# Patient Record
Sex: MALE | Race: ASIAN | HISPANIC OR LATINO
[De-identification: names, ages, dates, MRNs, and addresses within clinical notes are randomized per-mention and may not be internally consistent; named-entity substitution may affect disease eponyms.]

---

## 2020-10-06 ENCOUNTER — TRANSCRIPTION ENCOUNTER (OUTPATIENT)
Age: 45
End: 2020-10-06

## 2021-06-02 ENCOUNTER — TRANSCRIPTION ENCOUNTER (OUTPATIENT)
Age: 46
End: 2021-06-02

## 2023-02-08 ENCOUNTER — NON-APPOINTMENT (OUTPATIENT)
Age: 48
End: 2023-02-08

## 2023-02-14 PROBLEM — Z00.00 ENCOUNTER FOR PREVENTIVE HEALTH EXAMINATION: Status: ACTIVE | Noted: 2023-02-14

## 2023-02-16 ENCOUNTER — APPOINTMENT (OUTPATIENT)
Dept: DERMATOLOGY | Facility: CLINIC | Age: 48
End: 2023-02-16
Payer: MEDICAID

## 2023-02-16 VITALS — BODY MASS INDEX: 30.31 KG/M2 | WEIGHT: 200 LBS | HEIGHT: 68 IN

## 2023-02-16 PROCEDURE — 99204 OFFICE O/P NEW MOD 45 MIN: CPT | Mod: 25

## 2023-02-16 PROCEDURE — 17110 DESTRUCTION B9 LES UP TO 14: CPT

## 2023-02-17 NOTE — HISTORY OF PRESENT ILLNESS
[FreeTextEntry1] : np wound on lip [de-identified] : Mr. BRITT CABALLERO  is a 47 year old M here for evaluation of below\par #non healing wound on lip x 1 yr\par #crusted lesion on lip x 2 months\par #bump on body x yrs\par #psoriasis of scalp x yrs, using topicals\par \par \par \par

## 2023-02-17 NOTE — ASSESSMENT
[FreeTextEntry1] : #NUBS, lower mucosal lip; VV?\par lesion cauterized today, pt tolerated procedure well\par wound care discussed\par - fluconazole 200mg PO once to eliminate any candidal element\par \par #herpes labialis\par pt defers tx at this time\par \par #folliculitis\par deferred addressing at this time until acute lip lesion is resolved\par \par RTC 1 mo

## 2023-02-17 NOTE — PHYSICAL EXAM
[Alert] : alert [Oriented x 3] : ~L oriented x 3 [Well Nourished] : well nourished [Conjunctiva Non-injected] : conjunctiva non-injected [No Visual Lymphadenopathy] : no visual  lymphadenopathy [No Clubbing] : no clubbing [No Edema] : no edema [No Bromhidrosis] : no bromhidrosis [No Chromhidrosis] : no chromhidrosis [FreeTextEntry3] : submillimeter erosion on lower mucosal lip\par 2 hemorrhagic crusted papules on lower vermilion lip\par few scattered pustules and pink papules with collarettes of scale on trunk, extremities\par discrete psoriatic plaques in scalp\par

## 2023-03-29 ENCOUNTER — APPOINTMENT (OUTPATIENT)
Dept: DERMATOLOGY | Facility: CLINIC | Age: 48
End: 2023-03-29
Payer: MEDICAID

## 2023-03-29 PROCEDURE — 99214 OFFICE O/P EST MOD 30 MIN: CPT | Mod: 25

## 2023-03-29 PROCEDURE — 17110 DESTRUCTION B9 LES UP TO 14: CPT

## 2023-03-29 NOTE — HISTORY OF PRESENT ILLNESS
[FreeTextEntry1] :  wound on lip [de-identified] : Mr. BRITT CABALLERO  is a 47 year old M here for evaluation of below\par #non healing wound on lip x 1 yr. some response to cautery\par #crusted lesion on lip x 2 months\par #bump on body x yrs\par #psoriasis of scalp x yrs, using topicals\par \par \par \par

## 2023-03-29 NOTE — ASSESSMENT
[FreeTextEntry1] : #NUBS, lower mucosal lip; VV?\par lesion cauterized today, pt tolerated procedure well\par -treated with cautery at setting of 2.5; SED including burning, scarring, and incomplete tx. patient verbalized understanding\par wound care discussed\par \par #herpes labialis\par pt defers tx at this time\par \par #folliculitis\par deferred addressing at this time until acute lip lesion is resolved\par \par #seb derm\par mix HC and keto PRN; SED\par \par RTC 1 mo

## 2023-05-10 PROBLEM — B07.9 VERRUCA WARTS (INFECTIOUS): Status: ACTIVE | Noted: 2023-02-17

## 2023-05-10 PROBLEM — B37.0 CANDIDIASIS, MOUTH: Status: ACTIVE | Noted: 2023-02-16

## 2023-05-10 PROBLEM — D48.5 NEOPLASM OF UNCERTAIN BEHAVIOR OF SKIN: Status: ACTIVE | Noted: 2023-02-16

## 2023-05-10 PROBLEM — B00.1 HERPES LABIALIS: Status: ACTIVE | Noted: 2023-02-16

## 2023-05-11 ENCOUNTER — APPOINTMENT (OUTPATIENT)
Dept: DERMATOLOGY | Facility: CLINIC | Age: 48
End: 2023-05-11
Payer: MEDICAID

## 2023-05-11 DIAGNOSIS — D48.5 NEOPLASM OF UNCERTAIN BEHAVIOR OF SKIN: ICD-10-CM

## 2023-05-11 DIAGNOSIS — B07.9 VIRAL WART, UNSPECIFIED: ICD-10-CM

## 2023-05-11 DIAGNOSIS — B00.1 HERPESVIRAL VESICULAR DERMATITIS: ICD-10-CM

## 2023-05-11 DIAGNOSIS — B37.0 CANDIDAL STOMATITIS: ICD-10-CM

## 2023-05-11 PROCEDURE — 17110 DESTRUCTION B9 LES UP TO 14: CPT

## 2023-05-11 PROCEDURE — 99214 OFFICE O/P EST MOD 30 MIN: CPT | Mod: 25

## 2023-05-11 RX ORDER — FLUCONAZOLE 200 MG/1
200 TABLET ORAL DAILY
Qty: 7 | Refills: 2 | Status: ACTIVE | COMMUNITY
Start: 2023-02-16 | End: 1900-01-01

## 2023-05-11 NOTE — ASSESSMENT
[FreeTextEntry1] : #NUBS, lower mucosal lip; VV?\par lesion cauterized today, pt tolerated procedure well\par -treated with cautery at setting of 2.5; SED including burning, scarring, and incomplete tx. patient verbalized understanding\par wound care discussed\par \par #herpes labialis\par pt defers tx at this time\par \par #folliculitis\par deferred addressing at this time until acute lip lesion is resolved\par \par #seb derm\par mix HC and keto PRN; SED\par difuclan 200 mg PO daily x7 days; SED\par \par RTC 1 mo

## 2023-05-11 NOTE — HISTORY OF PRESENT ILLNESS
[FreeTextEntry1] :  wound on lip [de-identified] : Mr. BRITT CABALLERO  is a 47 year old M here for evaluation of below\par #non healing wound on lip x 1 yr. some response to cautery\par #crusted lesion on lip x 2 months\par #bump on body x yrs\par #psoriasis of scalp x yrs, using topicals\par \par \par \par

## 2023-06-07 ENCOUNTER — APPOINTMENT (OUTPATIENT)
Dept: DERMATOLOGY | Facility: CLINIC | Age: 48
End: 2023-06-07
Payer: MEDICAID

## 2023-07-06 ENCOUNTER — APPOINTMENT (OUTPATIENT)
Dept: DERMATOLOGY | Facility: CLINIC | Age: 48
End: 2023-07-06

## 2024-02-15 ENCOUNTER — NON-APPOINTMENT (OUTPATIENT)
Age: 49
End: 2024-02-15

## 2024-02-28 ENCOUNTER — APPOINTMENT (OUTPATIENT)
Dept: DERMATOLOGY | Facility: CLINIC | Age: 49
End: 2024-02-28
Payer: MEDICAID

## 2024-02-28 PROCEDURE — 99214 OFFICE O/P EST MOD 30 MIN: CPT

## 2024-02-28 RX ORDER — KETOCONAZOLE 20 MG/G
2 CREAM TOPICAL
Qty: 1 | Refills: 2 | Status: ACTIVE | COMMUNITY
Start: 2023-03-29 | End: 1900-01-01

## 2024-02-29 NOTE — HISTORY OF PRESENT ILLNESS
[de-identified] : Mr. BRITT CABALLERO  is a 47 year old M here for evaluation of below  #rash on face, body, and groin- not currently treating #cracked lips- was given Valtrex by outside provider but saw concerning side effects online so hasn't been taking  lesion on mucosal lip from last visit treated with cryo now resolved     [FreeTextEntry1] : RP cracked lips, rash

## 2024-02-29 NOTE — ASSESSMENT
[FreeTextEntry1] : #irritant dermatitis of lips, with component of lip lickers dermatitis -no evidence of herpes labialis today. No need for valtrex right now -Vaseline several times per day -avoid licking lips -hc 2.5% cream to AA bid as needed for up to 2 weeks at a time, SED  #folliculitis -clinda solution bid  #seb derm- face and groin mix HC and keto PRN for up to 1 week at a time, otherwise just keto bid; SED  RTC 6-8 weeks

## 2024-02-29 NOTE — PHYSICAL EXAM
[Alert] : alert [Oriented x 3] : ~L oriented x 3 [Well Nourished] : well nourished [Conjunctiva Non-injected] : conjunctiva non-injected [No Visual Lymphadenopathy] : no visual  lymphadenopathy [No Clubbing] : no clubbing [No Bromhidrosis] : no bromhidrosis [No Edema] : no edema [No Chromhidrosis] : no chromhidrosis [FreeTextEntry3] : lower vermillion lip with scaling and cracking few scattered pustules and pink papules with collarettes of scale on trunk, extremities pink scaly patches face and penile shaft

## 2024-04-24 ENCOUNTER — APPOINTMENT (OUTPATIENT)
Dept: DERMATOLOGY | Facility: CLINIC | Age: 49
End: 2024-04-24
Payer: MEDICAID

## 2024-04-24 DIAGNOSIS — L24.9 IRRITANT CONTACT DERMATITIS, UNSPECIFIED CAUSE: ICD-10-CM

## 2024-04-24 DIAGNOSIS — L73.9 FOLLICULAR DISORDER, UNSPECIFIED: ICD-10-CM

## 2024-04-24 DIAGNOSIS — L21.9 SEBORRHEIC DERMATITIS, UNSPECIFIED: ICD-10-CM

## 2024-04-24 PROCEDURE — 99214 OFFICE O/P EST MOD 30 MIN: CPT

## 2024-04-24 RX ORDER — CLINDAMYCIN PHOSPHATE 10 MG/ML
1 SOLUTION TOPICAL
Qty: 1 | Refills: 4 | Status: ACTIVE | COMMUNITY
Start: 2024-02-28 | End: 1900-01-01

## 2024-04-24 RX ORDER — HYDROCORTISONE 25 MG/G
2.5 CREAM TOPICAL
Qty: 1 | Refills: 2 | Status: ACTIVE | COMMUNITY
Start: 2023-03-29 | End: 1900-01-01

## 2024-04-25 PROBLEM — L24.9 IRRITANT DERMATITIS: Status: ACTIVE | Noted: 2024-02-29

## 2024-04-25 PROBLEM — L73.9 FOLLICULITIS: Status: ACTIVE | Noted: 2023-02-16

## 2024-04-25 PROBLEM — L21.9 SEBORRHEIC DERMATITIS: Status: ACTIVE | Noted: 2023-03-29

## 2024-04-25 NOTE — ASSESSMENT
[FreeTextEntry1] : #irritant dermatitis of lips, with component of lip lickers dermatitis -chronic, stable -Vaseline or Aquaphor several times per day -avoid licking lips -can use hc 2.5% cream to AA bid as needed for up to 2 weeks at a time, SED  #folliculitis- chronic, improved -clinda solution bid as needed, disc chronic nature  #seb derm- face and groin. Chronic, stable mix HC and keto PRN for up to 1 week at a time, SED  RTC prn

## 2024-04-25 NOTE — PHYSICAL EXAM
[Alert] : alert [Oriented x 3] : ~L oriented x 3 [Well Nourished] : well nourished [Conjunctiva Non-injected] : conjunctiva non-injected [No Visual Lymphadenopathy] : no visual  lymphadenopathy [No Clubbing] : no clubbing [No Edema] : no edema [No Bromhidrosis] : no bromhidrosis [No Chromhidrosis] : no chromhidrosis [FreeTextEntry3] : lips clear today few scattered pustules and pink papules with collarettes of scale on trunk, extremities

## 2024-04-25 NOTE — HISTORY OF PRESENT ILLNESS
[FreeTextEntry1] : RP cracked lips, rash [de-identified] : Mr. BRITT CABALLERO  is a 47 year old M here for evaluation of below  #rash on face, body, and groin- improved with clinda #cracked lips- improved with hc 2.5%, now only using prn once every 3-4 days

## 2024-10-31 ENCOUNTER — RX RENEWAL (OUTPATIENT)
Age: 49
End: 2024-10-31

## 2025-03-25 ENCOUNTER — APPOINTMENT (OUTPATIENT)
Dept: DERMATOLOGY | Facility: CLINIC | Age: 50
End: 2025-03-25
Payer: MEDICAID

## 2025-03-25 DIAGNOSIS — L40.8 OTHER PSORIASIS: ICD-10-CM

## 2025-03-25 DIAGNOSIS — L73.9 FOLLICULAR DISORDER, UNSPECIFIED: ICD-10-CM

## 2025-03-25 PROCEDURE — 99214 OFFICE O/P EST MOD 30 MIN: CPT

## 2025-03-25 RX ORDER — FLUOCINONIDE 0.5 MG/ML
0.05 SOLUTION TOPICAL
Qty: 1 | Refills: 3 | Status: ACTIVE | COMMUNITY
Start: 2025-03-25 | End: 1900-01-01

## 2025-03-25 RX ORDER — KETOCONAZOLE 20 MG/ML
2 SHAMPOO TOPICAL
Qty: 1 | Refills: 11 | Status: ACTIVE | COMMUNITY
Start: 2025-03-25 | End: 1900-01-01